# Patient Record
Sex: FEMALE | Race: WHITE
[De-identification: names, ages, dates, MRNs, and addresses within clinical notes are randomized per-mention and may not be internally consistent; named-entity substitution may affect disease eponyms.]

---

## 2018-10-13 ENCOUNTER — HOSPITAL ENCOUNTER (OUTPATIENT)
Dept: HOSPITAL 42 - ED | Age: 48
Setting detail: OBSERVATION
LOS: 1 days | Discharge: HOME | End: 2018-10-14
Attending: INTERNAL MEDICINE | Admitting: INTERNAL MEDICINE
Payer: MEDICARE

## 2018-10-13 VITALS — BODY MASS INDEX: 28.3 KG/M2

## 2018-10-13 DIAGNOSIS — F41.9: ICD-10-CM

## 2018-10-13 DIAGNOSIS — Z98.84: ICD-10-CM

## 2018-10-13 DIAGNOSIS — G89.29: ICD-10-CM

## 2018-10-13 DIAGNOSIS — M54.9: ICD-10-CM

## 2018-10-13 DIAGNOSIS — J45.901: Primary | ICD-10-CM

## 2018-10-13 LAB
ALBUMIN SERPL-MCNC: 4.2 G/DL (ref 3–4.8)
ALBUMIN/GLOB SERPL: 1.3 {RATIO} (ref 1.1–1.8)
ALT SERPL-CCNC: 34 U/L (ref 7–56)
AST SERPL-CCNC: 31 U/L (ref 14–36)
BASOPHILS # BLD AUTO: 0.02 K/MM3 (ref 0–2)
BASOPHILS NFR BLD: 0.3 % (ref 0–3)
BUN SERPL-MCNC: 15 MG/DL (ref 7–21)
CALCIUM SERPL-MCNC: 9.5 MG/DL (ref 8.4–10.5)
EOSINOPHIL # BLD: 0.2 10*3/UL (ref 0–0.7)
EOSINOPHIL NFR BLD: 3.2 % (ref 1.5–5)
ERYTHROCYTE [DISTWIDTH] IN BLOOD BY AUTOMATED COUNT: 12.8 % (ref 11.5–14.5)
GFR NON-AFRICAN AMERICAN: > 60
GRANULOCYTES # BLD: 5.16 10*3/UL (ref 1.4–6.5)
GRANULOCYTES NFR BLD: 68.6 % (ref 50–68)
HGB BLD-MCNC: 12.9 G/DL (ref 12–16)
LYMPHOCYTES # BLD: 1.7 10*3/UL (ref 1.2–3.4)
LYMPHOCYTES NFR BLD AUTO: 23.1 % (ref 22–35)
MCH RBC QN AUTO: 30.4 PG (ref 25–35)
MCHC RBC AUTO-ENTMCNC: 33.8 G/DL (ref 31–37)
MCV RBC AUTO: 90.1 FL (ref 80–105)
MONOCYTES # BLD AUTO: 0.4 10*3/UL (ref 0.1–0.6)
MONOCYTES NFR BLD: 4.8 % (ref 1–6)
PLATELET # BLD: 311 10^3/UL (ref 120–450)
PMV BLD AUTO: 11.1 FL (ref 7–11)
RBC # BLD AUTO: 4.24 10^6/UL (ref 3.5–6.1)
TROPONIN I SERPL-MCNC: < 0.01 NG/ML
WBC # BLD AUTO: 7.5 10^3/UL (ref 4.5–11)

## 2018-10-13 PROCEDURE — 93005 ELECTROCARDIOGRAM TRACING: CPT

## 2018-10-13 PROCEDURE — 84484 ASSAY OF TROPONIN QUANT: CPT

## 2018-10-13 PROCEDURE — 84100 ASSAY OF PHOSPHORUS: CPT

## 2018-10-13 PROCEDURE — 94640 AIRWAY INHALATION TREATMENT: CPT

## 2018-10-13 PROCEDURE — 80053 COMPREHEN METABOLIC PANEL: CPT

## 2018-10-13 PROCEDURE — 87804 INFLUENZA ASSAY W/OPTIC: CPT

## 2018-10-13 PROCEDURE — 85025 COMPLETE CBC W/AUTO DIFF WBC: CPT

## 2018-10-13 PROCEDURE — 36415 COLL VENOUS BLD VENIPUNCTURE: CPT

## 2018-10-13 PROCEDURE — 99284 EMERGENCY DEPT VISIT MOD MDM: CPT

## 2018-10-13 PROCEDURE — 83735 ASSAY OF MAGNESIUM: CPT

## 2018-10-13 PROCEDURE — 94760 N-INVAS EAR/PLS OXIMETRY 1: CPT

## 2018-10-13 PROCEDURE — 96374 THER/PROPH/DIAG INJ IV PUSH: CPT

## 2018-10-13 PROCEDURE — 71046 X-RAY EXAM CHEST 2 VIEWS: CPT

## 2018-10-13 RX ADMIN — IPRATROPIUM BROMIDE AND ALBUTEROL SULFATE SCH ML: .5; 3 SOLUTION RESPIRATORY (INHALATION) at 14:17

## 2018-10-13 RX ADMIN — METHYLPREDNISOLONE SODIUM SUCCINATE SCH MG: 40 INJECTION, POWDER, FOR SOLUTION INTRAMUSCULAR; INTRAVENOUS at 21:43

## 2018-10-13 RX ADMIN — IPRATROPIUM BROMIDE AND ALBUTEROL SULFATE SCH ML: .5; 3 SOLUTION RESPIRATORY (INHALATION) at 15:20

## 2018-10-13 RX ADMIN — IPRATROPIUM BROMIDE AND ALBUTEROL SULFATE SCH ML: .5; 3 SOLUTION RESPIRATORY (INHALATION) at 23:54

## 2018-10-13 NOTE — CP.PCM.HP
<Thomas Maxwell - Last Filed: 10/13/18 22:14>





History of Present Illness





- History of Present Illness


History of Present Illness: 


Thomas Maxwell DO PGY1 - Internal Medicine Intern - Hospital H&P





CC: wheezing, cough, chest tightness





47F PMH of asthma, chronic back pain, anxiety presented [place] [facility] [place] 10[place]13 w/ CC

of Wheezing, Cough, and Chest tightness worsening 5 days prior to presentation. 

Patient reports she rarely uses her albuterol inhaler and has never been 

intubated 2/2 asthma symptoms. She reports increased use of nebulizer and asthma

inhaler over the past week. Patient also saw PMD who started patient on 

azithromycin and medrol dose pack. She reports chronically taking 4mg prednisone

EOD. She reports a friend of her's has been coughing recently however she 

herself denies any fevers or chills. Patient has not had any change in 

environment latley, or and dose not live in a smoking household. She bambi any 

chest pain, abd pain, n/v/d/c, urinary discomfort, numbness/tingling, headhces 

and dizziness. Remainder of 12 system ROS is otherwise negative. 





PMD: [facility]


Pharmacy: [facility]


Social Hx: Denies EtOH, Smoking, and Illicit drug use; patient is unemployed. 


Allergies: Dust, cats, mold, cipro, iodine, levofloxacin, Moxifloxacin, 

penicillins


PSH: Gastric bypass 


PMH: As above


Home Rx: Valium 5 BID, Percocet  Q6H, Solumedrol 4mg EOD, Albuterol PRN, F

lonase PRN, Zofran ODT








Present on Admission





- Present on Admission


Any Indicators Present on Admission: No





Review of Systems





- Review of Systems


All systems: reviewed and no additional remarkable complaints except


Review of Systems: 





as per HPI








Past Patient History





- Past Social History


Smoking Status: Never Smoked





- HEMATOLOGICAL/ONCOLOGICAL


Hx Anemia: Yes





- PSYCHIATRIC


Hx Depression: No


Hx Emotional Abuse: No


Hx Physical Abuse: No


Hx Substance Use: No





- SURGICAL HISTORY


Hx Gastric Bypass Surgery: Yes


Hx Tonsillectomy: Yes


Hx Tubal Ligation: Yes





- ANESTHESIA


Hx Anesthesia: Yes


Hx Anesthesia Reactions: Yes





Meds


Allergies/Adverse Reactions: 


                                    Allergies











Allergy/AdvReac Type Severity Reaction Status Date / Time


 


ciprofloxacin [From Cipro] Allergy  REDNESS Verified 10/13/18 20:57


 


iodine Allergy  ANAPHYLAXIS Verified 10/13/18 20:57


 


levofloxacin [From Levaquin] Allergy  REDNESS Verified 10/13/18 20:57


 


moxifloxacin [From Avelox] Allergy  REDNESS Verified 10/13/18 20:57


 


Penicillins Allergy  REDNESS Verified 10/13/18 20:57














Physical Exam





- Constitutional


Appears: Well, Non-toxic, No Acute Distress





- Head Exam


Head Exam: ATRAUMATIC, NORMOCEPHALIC





- Eye Exam


Eye Exam: EOMI, Normal appearance, PERRL.  absent: Scleral icterus





- Neck Exam


Neck exam: Positive for: Normal Inspection





- Respiratory Exam


Respiratory Exam: Wheezes (Mild wheezing; prolonged expiratory ).  absent: 

Respiratory Distress


Additional comments: 





Cough





- Cardiovascular Exam


Cardiovascular Exam: RRR, +S1, +S2.  absent: Systolic Murmur





- GI/Abdominal Exam


GI & Abdominal Exam: Normal Bowel Sounds, Soft.  absent: Tenderness





- Back Exam


Back exam: absent: CVA tenderness (L), CVA tenderness (R)





- Neurological Exam


Neurological exam: Alert, CN II-XII Intact, Oriented x3





- Psychiatric Exam


Psychiatric exam: Normal Affect, Normal Mood





- Skin


Skin Exam: Dry, Intact, Warm





Results





- Vital Signs


Recent Vital Signs: 





                                Last Vital Signs











Temp  98.1 F   10/13/18 13:11


 


Pulse  84   10/13/18 18:13


 


Resp  18   10/13/18 18:13


 


BP  145/91 H  10/13/18 18:13


 


Pulse Ox  96   10/13/18 18:13














- Labs


Result Diagrams: 


                                 10/13/18 14:30





                                 10/13/18 15:00


Labs: 





                         Laboratory Results - last 24 hr











  10/13/18 10/13/18 10/13/18





  14:30 15:00 18:00


 


WBC  7.5  D  


 


RBC  4.24  


 


Hgb  12.9  


 


Hct  38.2  


 


MCV  90.1  


 


MCH  30.4  


 


MCHC  33.8  


 


RDW  12.8  


 


Plt Count  311  


 


MPV  11.1 H  


 


Gran %  68.6 H  


 


Lymph % (Auto)  23.1  


 


Mono % (Auto)  4.8  


 


Eos % (Auto)  3.2  


 


Baso % (Auto)  0.3  


 


Gran #  5.16  


 


Lymph # (Auto)  1.7  


 


Mono # (Auto)  0.4  


 


Eos # (Auto)  0.2  


 


Baso # (Auto)  0.02  


 


Sodium   140 


 


Potassium   3.7 


 


Chloride   105 


 


Carbon Dioxide   28 


 


Anion Gap   11 


 


BUN   15 


 


Creatinine   0.5 L 


 


Est GFR ( Amer)   > 60 


 


Est GFR (Non-Af Amer)   > 60 


 


Random Glucose   112 H 


 


Calcium   9.5 


 


Total Bilirubin   0.3 


 


AST   31 


 


ALT   34 


 


Alkaline Phosphatase   80 


 


Troponin I   < 0.01 


 


Total Protein   7.6 


 


Albumin   4.2 


 


Globulin   3.4 


 


Albumin/Globulin Ratio   1.3 


 


Influenza Typ A,B (EIA)    Negative for flu a/b














Assessment & Plan





- Assessment and Plan (Free Text)


Assessment: 





47F PMH of asthma, chronic back pain, anxiety presented to [facility] [place] on 10/13 w/ CC

of Wheezing, Cough, and Chest tightness worsening 5 days prior to presentation; 

Subsequently admitted for inpatient management of asthma exacerbation. 





PLAN: 





Asthma Exacerbation: 


CXR wnl


Flu swab negative 


Duoneb Q4H JASSON/ Q2H PRN


Azithromycin 250mg x1 tomorrow


Solumedrol 20mg IVP Q12





Chronic Back Pain


Percocet  Q6H PRN severe Pain





Anxiety


Valium 5mg BID 





DVT PPX: SCD 


GI PPX: Protonix 





Dispo: Patient admitted to obs for management of asthma exacerbation refractory 

to albuterol rescue inhaler





Patient was seen, examined, and discussed w/ attending physician Dr. Javi Maxwell DO PGY1 - Internal Medicine Intern - Hospital Progress Note 








- Date & Time


Date: 10/13/18


Time: 22:35





<Daniel Caldwell - Last Filed: 10/14/18 07:45>





Results





- Vital Signs


Recent Vital Signs: 





                                Last Vital Signs











Temp  98.2 F   10/13/18 19:15


 


Pulse  69   10/14/18 06:00


 


Resp  18   10/13/18 23:43


 


BP  133/86   10/13/18 19:15


 


Pulse Ox  96   10/13/18 19:15














- Labs


Result Diagrams: 


                                 10/13/18 14:30





                                 10/13/18 15:00


Labs: 





                         Laboratory Results - last 24 hr











  10/13/18 10/13/18 10/13/18





  14:30 15:00 18:00


 


WBC  7.5  D  


 


RBC  4.24  


 


Hgb  12.9  


 


Hct  38.2  


 


MCV  90.1  


 


MCH  30.4  


 


MCHC  33.8  


 


RDW  12.8  


 


Plt Count  311  


 


MPV  11.1 H  


 


Gran %  68.6 H  


 


Lymph % (Auto)  23.1  


 


Mono % (Auto)  4.8  


 


Eos % (Auto)  3.2  


 


Baso % (Auto)  0.3  


 


Gran #  5.16  


 


Lymph # (Auto)  1.7  


 


Mono # (Auto)  0.4  


 


Eos # (Auto)  0.2  


 


Baso # (Auto)  0.02  


 


Sodium   140 


 


Potassium   3.7 


 


Chloride   105 


 


Carbon Dioxide   28 


 


Anion Gap   11 


 


BUN   15 


 


Creatinine   0.5 L 


 


Est GFR ( Amer)   > 60 


 


Est GFR (Non-Af Amer)   > 60 


 


Random Glucose   112 H 


 


Calcium   9.5 


 


Total Bilirubin   0.3 


 


AST   31 


 


ALT   34 


 


Alkaline Phosphatase   80 


 


Troponin I   < 0.01 


 


Total Protein   7.6 


 


Albumin   4.2 


 


Globulin   3.4 


 


Albumin/Globulin Ratio   1.3 


 


Influenza Typ A,B (EIA)    Negative for flu a/b














Attending/Attestation





- Attestation


I have personally seen and examined this patient.: Yes


I have fully participated in the care of the patient.: Yes


I have reviewed all pertinent clinical information: Yes


Notes (Text): 





10/13/18 


47 year old female with past medical history of asthma, chronic back pain and 

anxiety who is admitted with asthma exacerbation with failed outpatient 

treatment.  Continue with iv steroids and duonebs.  She is also on azithromycin.

 


She is on percocet prn for chronic back pain and valium for history of anxiety.





Daniel Caldwell MD


Hospitalist.

## 2018-10-13 NOTE — ED PDOC
Arrival/HPI





- General


Chief Complaint: Shortness Of Breath


Time Seen by Provider: 10/13/18 12:30


Historian: Patient





- History of Present Illness


Narrative History of Present Illness (Text): 





10/13/18 13:11





A 47 year old female, whose past medical history includes asthma and gastric 

bypass surgery, presents to the emergency department with a complaint of 6 day 

duration shortness of breath. The patient states that the shortness of breath 

have been worsening over the last few days. She has taken her Albuterol pump 

treatments and Prednisone with no relief of her symptoms. She notes that she 

called her PMD who prescribed her a Z- pack and recommended she come into the 

emergency department for further evaluation of her symptoms. The patient states 

that she has been admitted to the hospital for asthma exacerbation in the past. 

The patient denies fevers, chills, headache, dizziness, sore throat, cough, 

chest pain, abdominal pain, nausea, vomiting, diarrhea, neck/back pain, 

urinary/bowel changes or any other complaint. 


 


PMD: Dr. Cruz


Time/Duration: Other (6 days)


Symptom Onset: Gradual


Symptom Course: Worsening


Activities at Onset: Rest, Light


Context: Home





Past Medical History





- Provider Review


Nursing Documentation Reviewed: Yes





- Hematological/Oncological


Hx Anemia: Yes





- Psychiatric


Hx Depression: No


Hx Emotional Abuse: No


Hx Physical Abuse: No


Hx Substance Use: No





- Surgical History


Hx Gastric Bypass Surgery: Yes


Hx Tonsillectomy: Yes


Hx Tubal Ligation: Yes





- Suicidal Assessment


Feels Threatened In Home Enviroment: No





Family/Social History





- Physician Review


Nursing Documentation Reviewed: Yes


Family/Social History: No Known Family HX


Smoking Status: Never Smoked


Hx Alcohol Use: No


Hx Substance Use: No


Hx Substance Use Treatment: No





Allergies/Home Meds


Allergies/Adverse Reactions: 


Allergies





ciprofloxacin [From Cipro] Allergy (Verified 10/13/18 13:10)


   REDNESS


iodine Allergy (Verified 10/13/18 13:10)


   ANAPHYLAXIS


levofloxacin [From Levaquin] Allergy (Verified 10/13/18 13:10)


   REDNESS


moxifloxacin [From Avelox] Allergy (Verified 10/13/18 13:10)


   REDNESS


Penicillins Allergy (Verified 10/13/18 13:10)


   REDNESS








Home Medications: 


                                    Home Meds











 Medication  Instructions  Recorded  Confirmed


 


Acetaminophen/Oxycodone Hydr 1 tab PO TID PRN 07/02/12 08/13/14





[Percocet 325 mg-7.5 mg]   


 


Alprazolam [Xanax] 0.5 mg PO TID 07/02/12 08/13/14


 


predniSONE [Prednisone] 5 mg PO DAILY 07/02/12 08/13/14


 


Albuterol Sulfate [Albuterol Hfa] 2 puff NEB Q6 PRN 09/05/12 08/13/14


 


B1/B2/Niacin/B12/Protease [B 1 tab PO DAILY 09/05/12 08/13/14





Complex & B12]   


 


Calcium/Vitamin D [Calcium 600 + 1 tab PO BID 09/05/12 08/13/14





Vitamin D 600 mg-200 Iu]   


 


Ferrous Sulfate 1 unit PO DAILY 09/05/12 08/13/14


 


Multivitamin, Minerals, and 1 tab PO BID 09/05/12 08/13/14





[Centrum Silver]   














Review of Systems





- Physician Review


All systems were reviewed & negative as marked: Yes





- Review of Systems


Constitutional: absent: Fevers


ENT: absent: Sore Throat


Respiratory: SOB


Cardiovascular: absent: Chest Pain


Gastrointestinal: absent: Abdominal Pain, Stool Changes, Constipation, Diarrhea,

Nausea


Genitourinary Female: absent: Urine Output Changes


Musculoskeletal: absent: Back Pain, Neck Pain


Neurological: absent: Headache, Dizziness





Physical Exam


Vital Signs Reviewed: Yes


Temperature: Afebrile


Blood Pressure: Hypertensive


Pulse: Regular


Respiratory Rate: Normal


Appearance: Positive for: Well-Appearing, Non-Toxic, Comfortable


Pain Distress: None


Mental Status: Positive for: Alert and Oriented X 3





- Systems Exam


Head: Present: Atraumatic, Normocephalic


Pupils: Present: PERRL


Extroacular Muscles: Present: EOMI


Conjunctiva: Present: Normal


Mouth: Present: Moist Mucous Membranes


Neck: Present: Normal Range of Motion


Respiratory/Chest: Present: Wheezes (Wheezing bilaterally. )


Cardiovascular: Present: Regular Rate and Rhythm, Normal S1, S2.  No: Murmurs


Abdomen: No: Tenderness, Distention, Peritoneal Signs


Back: Present: Normal Inspection


Upper Extremity: Present: Normal Inspection.  No: Cyanosis, Edema


Lower Extremity: Present: Normal Inspection.  No: Edema


Neurological: Present: GCS=15, CN II-XII Intact, Speech Normal


Skin: Present: Warm, Dry, Normal Color.  No: Rashes


Psychiatric: Present: Alert, Oriented x 3, Normal Insight, Normal Concentration





Medical Decision Making


ED Course and Treatment: 





10/13/18 13:17





Impression:


A 47 year old female, with a history of asthma, presents to the emergency 

department with a complaint of 6 day duration shortness of breath. On exam, olman

ent wheezing moderately bilaterally, but speaking full sentences in NAD. 





Plan:


-- EKG


-- Chest X-ray 


-- Labs


-- SOLU- Medrol and Magnesium Sulfate


-- Reassess and disposition





Prior Visits:


Notes and results from previous visits were reviewed.





Progress Notes:





10/13/18 13:04


EKG:


Ordered, reviewed, and independently interpreted the EKG.


Rate : 69 BPM


Rhythm : NSR


Interpretation : No STEMI





10/13/18 16:22


lungs cta upon reassessment. pt notes some recurrent sob however and feels like 

she needs to stay. Given hx of admission for asthma, will place in obs for 

asthma. Consulted Dr. Cruz: We are to admit to hospitalist. appreciate consult

w/ Dr. Caldwell: to admit to his service, endorsed pending XR. 








- Lab Interpretations


I have reviewed the lab results: Yes





- EKG Interpretation


Interpreted by ED Physician: Yes


Type: 12 lead EKG





- Scribe Statement


The provider has reviewed the documentation as recorded by the Scribe





Phyllis Cary





Provider Scribe Attestation:


All medical record entries made by the Scribe were at my direction and 

personally dictated by me. I have reviewed the chart and agree that the record 

accurately reflects my personal performance of the history, physical exam, 

medical decision making, and the department course for this patient. I have also

personally directed, reviewed, and agree with the discharge instructions and 

disposition.








Disposition/Present on Arrival





- Present on Arrival


Any Indicators Present on Arrival: No


History of DVT/PE: No


History of Uncontrolled Diabetes: No


Urinary Catheter: No


History of Decub. Ulcer: No


History Surgical Site Infection Following: None





- Disposition


Have Diagnosis and Disposition been Completed?: Yes


Diagnosis: 


 Asthma





Disposition Time: 16:15


Condition: GOOD


Referrals: 


Doe Cruz MD [Primary Care Provider] - Follow up with primary


Forms:  [employer] (English)

## 2018-10-13 NOTE — RAD
Date of service: 



10/13/2018



HISTORY:

 sob 



COMPARISON:

9/5/2012



TECHNIQUE:

Chest PA and lateral



FINDINGS:



LUNGS:

No active pulmonary disease.



PLEURA:

No significant pleural effusion identified. No pneumothorax apparent.



CARDIOVASCULAR:

Normal.



OSSEOUS STRUCTURES:

No significant abnormalities.



VISUALIZED UPPER ABDOMEN:

Normal.



OTHER FINDINGS:

None.



IMPRESSION:

No active disease.

## 2018-10-14 VITALS
DIASTOLIC BLOOD PRESSURE: 73 MMHG | HEART RATE: 99 BPM | SYSTOLIC BLOOD PRESSURE: 119 MMHG | TEMPERATURE: 98.2 F | OXYGEN SATURATION: 95 %

## 2018-10-14 VITALS — RESPIRATION RATE: 20 BRPM

## 2018-10-14 LAB
ALBUMIN SERPL-MCNC: 4.3 G/DL (ref 3–4.8)
ALBUMIN/GLOB SERPL: 1.3 {RATIO} (ref 1.1–1.8)
ALT SERPL-CCNC: 24 U/L (ref 7–56)
AST SERPL-CCNC: 29 U/L (ref 14–36)
BASOPHILS # BLD AUTO: 0 K/MM3 (ref 0–2)
BASOPHILS NFR BLD: 0 % (ref 0–3)
BUN SERPL-MCNC: 15 MG/DL (ref 7–21)
CALCIUM SERPL-MCNC: 9.7 MG/DL (ref 8.4–10.5)
EOSINOPHIL # BLD: 0 10*3/UL (ref 0–0.7)
EOSINOPHIL NFR BLD: 0 % (ref 1.5–5)
ERYTHROCYTE [DISTWIDTH] IN BLOOD BY AUTOMATED COUNT: 12.4 % (ref 11.5–14.5)
GFR NON-AFRICAN AMERICAN: > 60
GRANULOCYTES # BLD: 5.39 10*3/UL (ref 1.4–6.5)
GRANULOCYTES NFR BLD: 77.5 % (ref 50–68)
HGB BLD-MCNC: 12.5 G/DL (ref 12–16)
LYMPHOCYTES # BLD: 1.1 10*3/UL (ref 1.2–3.4)
LYMPHOCYTES NFR BLD AUTO: 16.2 % (ref 22–35)
MCH RBC QN AUTO: 30 PG (ref 25–35)
MCHC RBC AUTO-ENTMCNC: 32.6 G/DL (ref 31–37)
MCV RBC AUTO: 92.1 FL (ref 80–105)
MONOCYTES # BLD AUTO: 0.4 10*3/UL (ref 0.1–0.6)
MONOCYTES NFR BLD: 6.3 % (ref 1–6)
PLATELET # BLD: 324 10^3/UL (ref 120–450)
PMV BLD AUTO: 11.1 FL (ref 7–11)
RBC # BLD AUTO: 4.16 10^6/UL (ref 3.5–6.1)
WBC # BLD AUTO: 7 10^3/UL (ref 4.5–11)

## 2018-10-14 RX ADMIN — OXYCODONE AND ACETAMINOPHEN PRN TAB: 10; 325 TABLET ORAL at 09:46

## 2018-10-14 RX ADMIN — IPRATROPIUM BROMIDE AND ALBUTEROL SULFATE SCH ML: .5; 3 SOLUTION RESPIRATORY (INHALATION) at 07:44

## 2018-10-14 RX ADMIN — IPRATROPIUM BROMIDE AND ALBUTEROL SULFATE SCH ML: .5; 3 SOLUTION RESPIRATORY (INHALATION) at 04:17

## 2018-10-14 RX ADMIN — METHYLPREDNISOLONE SODIUM SUCCINATE SCH MG: 40 INJECTION, POWDER, FOR SOLUTION INTRAMUSCULAR; INTRAVENOUS at 09:36

## 2018-10-14 RX ADMIN — OXYCODONE AND ACETAMINOPHEN PRN TAB: 10; 325 TABLET ORAL at 15:36

## 2018-10-14 RX ADMIN — OXYCODONE AND ACETAMINOPHEN PRN TAB: 10; 325 TABLET ORAL at 03:42

## 2018-10-14 NOTE — CARD
--------------- APPROVED REPORT --------------





Date of service: 10/13/2018



EKG Measurement

Heart [id number]

DE 124P5

[id number]

[id number]

[id number]



<Conclusion>

Normal sinus rhythm

Normal ECG

## 2022-10-24 NOTE — CP.PCM.DIS
<Rodrigo Garcia - Last Filed: 10/14/18 12:21>





Provider





- Provider


Date of Admission: 


10/13/18 16:19





Attending physician: 


Daniel Caldwell MD





Primary care physician: 


Doe Cruz MD





Time Spent in preparation of Discharge (in minutes): 38





Diagnosis





- Discharge Diagnosis


(1) Asthma exacerbation


Status: Resolved   Priority: High   





Hospital Course





- Lab Results


Lab Results: 


                             Most Recent Lab Values











WBC  7.0 10^3/ul (4.5-11.0)   10/14/18  07:00    


 


RBC  4.16 10^6/uL (3.5-6.1)   10/14/18  07:00    


 


Hgb  12.5 g/dL (12.0-16.0)   10/14/18  07:00    


 


Hct  38.3 % (36.0-48.0)   10/14/18  07:00    


 


MCV  92.1 fl (80.0-105.0)   10/14/18  07:00    


 


MCH  30.0 pg (25.0-35.0)   10/14/18  07:00    


 


MCHC  32.6 g/dl (31.0-37.0)   10/14/18  07:00    


 


RDW  12.4 % (11.5-14.5)   10/14/18  07:00    


 


Plt Count  324 10^3/uL (120.0-450.0)   10/14/18  07:00    


 


MPV  11.1 fl (7.0-11.0)  H  10/14/18  07:00    


 


Gran %  77.5 % (50.0-68.0)  H  10/14/18  07:00    


 


Lymph % (Auto)  16.2 % (22.0-35.0)  L  10/14/18  07:00    


 


Mono % (Auto)  6.3 % (1.0-6.0)  H  10/14/18  07:00    


 


Eos % (Auto)  0.0 % (1.5-5.0)  L  10/14/18  07:00    


 


Baso % (Auto)  0.0 % (0.0-3.0)   10/14/18  07:00    


 


Gran #  5.39  (1.4-6.5)   10/14/18  07:00    


 


Lymph # (Auto)  1.1  (1.2-3.4)  L  10/14/18  07:00    


 


Mono # (Auto)  0.4  (0.1-0.6)   10/14/18  07:00    


 


Eos # (Auto)  0.0  (0.0-0.7)   10/14/18  07:00    


 


Baso # (Auto)  0.00 K/mm3 (0.0-2.0)   10/14/18  07:00    


 


Sodium  138 mmol/L (132-148)   10/14/18  07:00    


 


Potassium  4.2 mmol/L (3.6-5.0)   10/14/18  07:00    


 


Chloride  100 mmol/L ()   10/14/18  07:00    


 


Carbon Dioxide  30 mmol/L (21-33)   10/14/18  07:00    


 


Anion Gap  13  (10-20)   10/14/18  07:00    


 


BUN  15 mg/dL (7-21)   10/14/18  07:00    


 


Creatinine  0.5 mg/dl (0.7-1.2)  L  10/14/18  07:00    


 


Est GFR ( Amer)  > 60   10/14/18  07:00    


 


Est GFR (Non-Af Amer)  > 60   10/14/18  07:00    


 


Random Glucose  96 mg/dL ()   10/14/18  07:00    


 


Calcium  9.7 mg/dL (8.4-10.5)   10/14/18  07:00    


 


Phosphorus  3.9 mg/dL (2.5-4.5)   10/14/18  07:00    


 


Magnesium  2.2 mg/dL (1.7-2.2)   10/14/18  07:00    


 


Total Bilirubin  0.3 mg/dL (0.2-1.3)   10/14/18  07:00    


 


AST  29 U/L (14-36)   10/14/18  07:00    


 


ALT  24 U/L (7-56)   10/14/18  07:00    


 


Alkaline Phosphatase  68 U/L ()   10/14/18  07:00    


 


Troponin I  < 0.01 ng/mL  10/13/18  15:00    


 


Total Protein  7.6 g/dL (5.8-8.3)   10/14/18  07:00    


 


Albumin  4.3 g/dL (3.0-4.8)   10/14/18  07:00    


 


Globulin  3.3 gm/dL  10/14/18  07:00    


 


Albumin/Globulin Ratio  1.3  (1.1-1.8)   10/14/18  07:00    


 


Influenza Typ A,B (EIA)  Negative for flu a/b  (NEGATIVE)   10/13/18  18:00    














- Hospital Course


Hospital Course: 


CC: wheezing, cough, chest tightness





47F PMH of asthma, chronic back pain, anxiety presented to [facility] ED on 10/13 w/ CC

of Wheezing, Cough, and Chest tightness worsening 5 days prior to presentation. 

Patient reports she rarely uses her albuterol inhaler and has never been 

intubated 2/2 asthma symptoms. She reports increased use of nebulizer and asthma

inhaler over the past week. Patient also saw PMD who started patient on 

azithromycin and medrol dose pack. She reports chronically taking 4mg prednisone

EOD. She reports a friend of her's has been coughing recently however she 

herself denies any fevers or chills. Patient has not had any change in 

environment latley, or and dose not live in a smoking household. She bambi any 

chest pain, abd pain, n/v/d/c, urinary discomfort, numbness/tingling, headhces 

and dizziness. Remainder of 12 system ROS is otherwise negative. 





PMD: [facility]


Pharmacy: [facility]


Social Hx: Denies EtOH, Smoking, and Illicit drug use; patient is unemployed. 


Allergies: Dust, cats, mold, cipro, iodine, levofloxacin, Moxifloxacin, 

penicillins


PSH: Gastric bypass 


PMH: As above


Home Rx: Valium 5 BID, Percocet  Q6H, Solumedrol 4mg EOD, Albuterol PRN, 

Flonase PRN, Zofran ODT





HOSPITAL COURSE:





Symptoms of asthma exacerbation improved significantly. She was given duoben 3ml

q4h and prn for rescue. She was also given solumedrol 20mg ivp q12h and 

azithromycin 250mg po once. Her flu swab was negative and her CXR was clear. 

It's possible her asthma exacerbation was triggered by a recent URI. Her labwork

was benign. Some of her home medications were continued for her chronic 

conditions. Lungs were clear by discharge. Discharge instructions were clearly 

explained to her. 





Discharge Exam





- Head Exam


Head Exam: ATRAUMATIC, NORMOCEPHALIC





- Additional Findings


Additional findings: 


- Constitutional


Appears: Well, Non-toxic, No Acute Distress





- Head Exam


Head Exam: ATRAUMATIC, NORMOCEPHALIC





- Eye Exam


Eye Exam: EOMI, Normal appearance, PERRL.  absent: Scleral icterus





- Neck Exam


Neck exam: Positive for: Normal Inspection





- Respiratory Exam


Respiratory Exam: clear to auscultation bilaterally  absent: Respiratory 

Distress


Additional comments: 








- Cardiovascular Exam


Cardiovascular Exam: RRR, +S1, +S2.  absent: Systolic Murmur





- GI/Abdominal Exam


GI & Abdominal Exam: Normal Bowel Sounds, Soft.  absent: Tenderness





- Back Exam


Back exam: absent: CVA tenderness (L), CVA tenderness (R)





- Neurological Exam


Neurological exam: Alert, CN II-XII Intact, Oriented x3





- Psychiatric Exam


Psychiatric exam: Normal Affect, Normal Mood





- Skin


Skin Exam: Dry, Intact, Warm











Discharge Plan





- Discharge Medications


Prescriptions: 


Methylprednisolone [Medrol Dose Pack (21 tabs)] See Taper PO DAILY #21 mg





- Follow Up Plan


Condition: GOOD


Disposition: HOME/ ROUTINE


Additional Instructions: 


Please follow-up with your primary medical doctor, Dr Cruz, within 1 week.





You will be given a script for a medrol dose pack - this is a 6 day course which

you should take as instructed on the box.





Continue all your home medications as prescribed by Dr Cruz. 





You need to establish care with a pulmonologist (lung doctor) so he/she can do 

appropriate tests to assess your level of asthma and possibly prescribe you a 

new inhaler in addition to your albuterol.





If symptoms return go to your nearest emergency department.


Referrals: 


Doe Cruz MD [Primary Care Provider] - 





<Daniel Caldwell - Last Filed: 10/14/18 13:32>





Provider





- Provider


Date of Admission: 


10/13/18 16:19





Attending physician: 


Daniel Caldwell MD





Primary care physician: 


Doe Cruz MD








Hospital Course





- Lab Results


Lab Results: 


                             Most Recent Lab Values











WBC  7.0 10^3/ul (4.5-11.0)   10/14/18  07:00    


 


RBC  4.16 10^6/uL (3.5-6.1)   10/14/18  07:00    


 


Hgb  12.5 g/dL (12.0-16.0)   10/14/18  07:00    


 


Hct  38.3 % (36.0-48.0)   10/14/18  07:00    


 


MCV  92.1 fl (80.0-105.0)   10/14/18  07:00    


 


MCH  30.0 pg (25.0-35.0)   10/14/18  07:00    


 


MCHC  32.6 g/dl (31.0-37.0)   10/14/18  07:00    


 


RDW  12.4 % (11.5-14.5)   10/14/18  07:00    


 


Plt Count  324 10^3/uL (120.0-450.0)   10/14/18  07:00    


 


MPV  11.1 fl (7.0-11.0)  H  10/14/18  07:00    


 


Gran %  77.5 % (50.0-68.0)  H  10/14/18  07:00    


 


Lymph % (Auto)  16.2 % (22.0-35.0)  L  10/14/18  07:00    


 


Mono % (Auto)  6.3 % (1.0-6.0)  H  10/14/18  07:00    


 


Eos % (Auto)  0.0 % (1.5-5.0)  L  10/14/18  07:00    


 


Baso % (Auto)  0.0 % (0.0-3.0)   10/14/18  07:00    


 


Gran #  5.39  (1.4-6.5)   10/14/18  07:00    


 


Lymph # (Auto)  1.1  (1.2-3.4)  L  10/14/18  07:00    


 


Mono # (Auto)  0.4  (0.1-0.6)   10/14/18  07:00    


 


Eos # (Auto)  0.0  (0.0-0.7)   10/14/18  07:00    


 


Baso # (Auto)  0.00 K/mm3 (0.0-2.0)   10/14/18  07:00    


 


Sodium  138 mmol/L (132-148)   10/14/18  07:00    


 


Potassium  4.2 mmol/L (3.6-5.0)   10/14/18  07:00    


 


Chloride  100 mmol/L ()   10/14/18  07:00    


 


Carbon Dioxide  30 mmol/L (21-33)   10/14/18  07:00    


 


Anion Gap  13  (10-20)   10/14/18  07:00    


 


BUN  15 mg/dL (7-21)   10/14/18  07:00    


 


Creatinine  0.5 mg/dl (0.7-1.2)  L  10/14/18  07:00    


 


Est GFR ( Amer)  > 60   10/14/18  07:00    


 


Est GFR (Non-Af Amer)  > 60   10/14/18  07:00    


 


Random Glucose  96 mg/dL ()   10/14/18  07:00    


 


Calcium  9.7 mg/dL (8.4-10.5)   10/14/18  07:00    


 


Phosphorus  3.9 mg/dL (2.5-4.5)   10/14/18  07:00    


 


Magnesium  2.2 mg/dL (1.7-2.2)   10/14/18  07:00    


 


Total Bilirubin  0.3 mg/dL (0.2-1.3)   10/14/18  07:00    


 


AST  29 U/L (14-36)   10/14/18  07:00    


 


ALT  24 U/L (7-56)   10/14/18  07:00    


 


Alkaline Phosphatase  68 U/L ()   10/14/18  07:00    


 


Troponin I  < 0.01 ng/mL  10/13/18  15:00    


 


Total Protein  7.6 g/dL (5.8-8.3)   10/14/18  07:00    


 


Albumin  4.3 g/dL (3.0-4.8)   10/14/18  07:00    


 


Globulin  3.3 gm/dL  10/14/18  07:00    


 


Albumin/Globulin Ratio  1.3  (1.1-1.8)   10/14/18  07:00    


 


Influenza Typ A,B (EIA)  Negative for flu a/b  (NEGATIVE)   10/13/18  18:00    














Attending/Attestation





- Attestation


I have personally seen and examined this patient.: Yes


I have fully participated in the care of the patient.: Yes


I have reviewed all pertinent clinical information, including history, physical 

exam and plan: Yes


Notes (Text): 





10/14/18 13:31


47 year old female with past medical history of asthma, chronic back pain and 

anxiety who presented with asthma exacerbation with failed outpatient treatment.

 She was started on iv steroids and duonebs with improvement of symptoms.


She is on percocet prn for chronic back pain and valium for history of anxiety.


Following day her symptoms improved.


She is discharged home on tapering steroids.


Follow up with pmd, Dr. Cruz.


Follow up with pulmonologist.


Outpatient PFTs.





Daniel Caldwell MD


Hospitalist. Let spouse know procedure would be this Wednesday for a 9:30am arrival.